# Patient Record
Sex: FEMALE | Race: WHITE | Employment: FULL TIME | URBAN - METROPOLITAN AREA
[De-identification: names, ages, dates, MRNs, and addresses within clinical notes are randomized per-mention and may not be internally consistent; named-entity substitution may affect disease eponyms.]

---

## 2020-12-10 ENCOUNTER — HOSPITAL ENCOUNTER (OUTPATIENT)
Dept: PHYSICAL THERAPY | Age: 35
Discharge: HOME | End: 2020-12-10
Payer: COMMERCIAL

## 2020-12-10 PROCEDURE — 97110 THERAPEUTIC EXERCISES: CPT

## 2020-12-10 PROCEDURE — 97161 PT EVAL LOW COMPLEX 20 MIN: CPT

## 2020-12-10 NOTE — THERAPY EVALUATION
Eyal Valle : 1985 Primary: [place] [employer]* Secondary:  [facility]rapy Center at [facility] 
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  
Patient has no allergy information on record. TREATMENT PLAN: 
Effective Dates: 12/10/2020 TO 3/10/2021 (90 days). Frequency/Duration: 1 time a week for 90 Day(s) MEDICAL/REFERRING DIAGNOSIS: 
Personal history of other complications of pregnancy, childbirth and the puerperium [Z87.59] Stress incontinence (female) (male) [N39.3] Unspecified dyspareunia [N94.10] DATE OF ONSET: chronic REFERRING PHYSICIAN: Mahogany Nguyen MD MD Orders: evaluate and treat Return MD Appointment: 2021 INITIAL ASSESSMENT:  Ms. Vieyra Seen presents with pelvic floor muscle over activity, lack of coordination, timing, awareness, endurance and strength. Patient has a chronic history of frequency and leakage that started when she was a child. After the birth of her son causing a grade 4 tear symptoms have been worse. She has painful and tight pelvic floor muscles and a very weak contraction. Has scar tissue at the perineal body. Patient also has mild bladder health habits that may be worsening symptoms. Patient would benefit from skilled physical therapy to address her deficits and maximize her function. PROBLEM LIST (Impacting functional limitations): 
Decreased [place] with Home Exercise Program 
Decreased coordination Decreased strength of pelvic floor which limits bladder control INTERVENTIONS PLANNED: 
1. Family Education 2. Home Exercise Program (HEP) 3. Manual Therapy 4. Neuromuscular Re-education/Strengthening 5. Therapeutic Activites 6. Therapeutic Exercise/Strengthening GOALS: (Goals have been discussed and agreed upon with patient.) Short-Term Functional Goals: Time Frame: 1. Pt will demonstrate I with basic PFM HEP to improve awareness, coordination, and timing of PFM. 2. Pt will demonstrate understanding of and ability to teach back appropriate water and fiber intake, toileting frequency, and positioning for improved self-management of symptoms. 3. Pt will demonstrate ability to isolate a pelvic floor contraction without breath holding and minimal to no accessory muscle use in order to implement the knack and/or urge suppression 4. Pt will demonstrate ability to perform diaphragmatic breathing and quick flick pelvic floor contractions in order to implement urge suppression 5. Pt will demonstrate ability to perform diaphragmatic breathing in multiple positions to assist in pelvic floor tension reduction. Discharge Goals: Time Frame: 1. Pt will demonstrate ability to voluntarily contract the pelvic floor muscles prior to a cough or sneeze for decreased leakage during increases in intra-abdominal pressure. 2. Pt will demonstrate independence with an advanced HEP for general conditioning, core stabilization, and mobility to facilitate carry over and independent management of symptoms. 3. Pt will be independent in implementation of a timed voiding schedule and use of urge suppression to reduce urinary frequency to 15/day and 1/night. OUTCOME MEASURE:  
Tool Used: Pelvic Floor Impact Questionnaireshort form 7 (PFIQ-7) Score:  Initial: · Bladder or Urine: 52/100 · Bowel or Rectum: 76/100 · Vagina or Pelvis: 80/100 Most Recent: X (Date: -- ) · Bladder or Urine: X 
· Bowel or Rectum: X · Vagina or Pelvis: X Interpretation of Score: Each of the 7 sections is scored on a scale from 0-3; 0 representing \"Not at all\", 3 representing \"Quite a bit\". The mean value is taken from all the answered items, then multiplied by 100 to obtain the scale score, ranging from 0-100. This process is repeated for each column representing bowel, bladder, and pelvic pain. Medical Necessity:  
· Patient demonstrates GOOD rehab potential due to higher previous functional level. Reason for Services/Other Comments: 
· Patient continues to require skilled intervention due to above mentioned deficits Tessie Henderson Total Duration: PT Patient Time In/Time Out Time In: 0800 Time Out: 0900 Rehabilitation Potential For Stated Goals: Good Regarding Penny Reed's therapy, I certify that the treatment plan above will be carried out by a therapist or under their direction. Thank you for this referral, 
Angel Augustine DPT Referring Physician Signature: Seng Farmer MD _______________________________ Date _____________ HISTORY:  
History of Present Injury/Illness (Reason for Referral): Ms. Brian Ramirez is a 38 yo female referred to pelvic floor physical therapy (PFPT) by Seng Farmer MD 2/2 stress incontinence, dyspareunia. Pt reports that symptoms began when she was a child. They looked into bladder enlargement surgery (89 years old) but wasn't eligible. She would have accidents until she was 9. She goes about 20 times a day. After her child she had a grade 4 tear (8 years). She didn't know pelvic floor existed. She has dumping syndrome from gallbladder removal. This causes incontinence with stool. Urinary: Frequency 20-25 x/day, 2-3 x/night. Positive for stress urinary incontinence, urge urinary incontinence, urinary urgency, urinary frequency and nocturia. Negative for incomplete emptying, urinary hesitancy/intermittency, dysuria, hematuria and nocturnal enuresis. Pt does not use pads for urinary protection; 0 pads per day (PPD). Fluid intake: 6 bottles  water/day; bladder irritants include: juice, coffee, hot tea. Pt does not limit fluid intake due to bladder control. Bowel: Frequency 4-5. Positive for fecal incontinence. Negative for pain with bowel movement, pushing/straining with bowel movement, constipation and incomplete emptying. Pt does not use pads for bowel protection; 0 pads per day (PPD). Sexual: Pt is sexually active with male partners. positive for dyspareunia (occasion). Pain is deep. Contraception/birth control: implant. Pelvic Organ Prolapse/Pelvic Pain: Location: none OB History: Number of pregnancies: 1 Number of vaginal deliveries: 1 Number of c-sections: 0. Past Medical History/Comorbidities: Ms. Jeny Gatica  has no past medical history on file. Ms. Jeny Gatica  has no past surgical history on file. Social History/Living Environment:  
  living with  and child Have you ever had any pelvic trauma (orthopedic in nature, fall, MVA, etc.)? NO Have you ever experienced any unwanted physical or sexual contact? NO Have you ever experienced any form of medical trauma (GYN, urological, GI, etc)? NO Prior Level of Function/Work/Activity: 
Prior level of function: indendent Occupation:  Exercise activities: - Personal Factors:   
      Sex:  female Age:  37 y.o. Ambulatory/Rehab Services H2 Model Falls Risk Assessment Risk Factors: 
     No Risk Factors Identified Ability to Rise from Chair: 
     (0)  Ability to rise in a single movement Falls Prevention Plan: No modifications necessary Total: (5 or greater = High Risk): 0  
 ©2010 [facility] [employer] States Patent #[id number]. Federal Law prohibits the replication, distribution or use without written permission from [facility] Current Medications: No current outpatient medications on file. Date Last Reviewed:  12/10/2020 Number of Personal Factors/Comorbidities that affect the Plan of Care: 0: LOW COMPLEXITY EXAMINATION:  
OBSERVATION:  
External Observations: ? Voluntary contraction: [] absent     [x] present ? Perineal descent at rest: [] absent     [x] present ? Perineal descent with bearing: [x] absent     [] present 
? Skin integrity: intact Pelvic Floor Muscle (PFM) Assessment: ? Vaginal vault size: [] decreased     [] increased     [x] WNL ? Muscle volume: [] decreased     [x] WNL     [] Defect ? PFM tension: [] decreased     [x] increased     [] WNL Contraction ability: 
Voluntary contraction: [] absent     [x] weak     [] moderate      [] strong Voluntary relaxation: [] absent     [x] partial     [] complete MMT: 1-2/5 Overflow: [] absent     [] min     [x] mod     [] severe / Compensatory mm groups include abdominals Tissue laxity test: Anterior wall: [] minimum     [] moderate     [] severe      [x] WNL Posterior wall: [] minimum     [] moderate     [] severe      [x] WNL Palpation: via vaginal canal (increased tone) Superficial Pelvic Floor Musculature (PFM): Tender? Intermediate PFM Tender? Deep PFM Tender? Superficial Transverse Perineal [x] Right  [x] Left  []N/T Deep Transverse Perineal [x] Right  [x] Left  []N/T Puborectalis [x] Right  [x] Left  []N/T Ischiocavernosus [] Right  [] Left  []N/T Compressor Urethra [] Right  [] Left  []N/T Pubococcygeus [] Right  [] Left  []N/T Bulbocavernosus [] Right  [] Left  []N/T   Ileococcygeus [] Right  [] Left  []N/T  
    Obturator Internus [x] Right  [x] Left  []N/T  
 Coccygeus [] Right  [] Left  []N/T Body Structures Involved: 1. Muscles Body Functions Affected: 1. Genitourinary 2. Neuromusculoskeletal 
3. Movement Related Activities and Participation Affected: 1. Self Care 2. Domestic Life 3. Community, Social and [place] Number of elements (examined above) that affect the Plan of Care: 1-2: LOW COMPLEXITY CLINICAL PRESENTATION:  
Presentation: Stable and uncomplicated: LOW COMPLEXITY CLINICAL DECISION MAKING:  
Use of outcome tool(s) and clinical judgement create a POC that gives a: Clear prediction of patient's progress: LOW COMPLEXITY

## 2020-12-10 NOTE — PROGRESS NOTES
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 TREATMENT PLAN:  Effective Dates: 12/10/2020 TO 3/10/2021 (90 days). Frequency/Duration: 1 time a week for 90 Day(s) MEDICAL/REFERRING DIAGNOSIS:  Personal history of other complications of pregnancy, childbirth and the puerperium [Z87.59]  Stress incontinence (female) (male) [N39.3]  Unspecified dyspareunia [N94.10]   DATE OF ONSET: chronic  REFERRING PHYSICIAN: Veneta Kussmaul, MD MD Orders: evaluate and treat  Return MD Appointment: 2021     Pre-treatment Symptoms/Complaints:  see evaluation  Pain: Initial:   0 Post Session:  010   Medications Last Reviewed:  12/10/2020   Updated Objective Findings:  See evaluation note from today   TREATMENT:   THERAPEUTIC EXERCISE: (10 minutes):  Exercises per grid below to improve mobility, strength, and coordination. Required minimal visual, verbal, and manual cues to promote proper body alignment, promote proper body posture, and promote proper body mechanics. Progressed resistance, range, and repetitions as indicated. All exercises performed with emphasis on kegel and breathing in order improve coordination, awareness and to minimize symptoms.     Date:  12-10-20 Date:   Date:     Activity/Exercise Parameters Parameters Parameters   Patient Education Discussed HEP and POC     Bladder health reviewed     drops reviewed                                Pt gives verbal consent to internal  assessment/treatment no chaperon present. [employer] Portal     Treatment/Session Summary:  Pt reports good understanding of plan of care, as well as prescribed home exercise program.  All questions were answered to pt's satisfaction. Pt was invited to call with any further questions or concerns. · Response to Treatment:  see evaluation. · Communication/Consultation:  None today  · Equipment provided today:  None today  · Recommendations/Intent for next treatment session: Next visit will focus on biofeedback, the knack, urge suppression, manual therapy.   Total Treatment Billable Duration:  10 minutes  PT Patient Time In/Time Out  Time In: 0800  Time Out: 0900  Clarke Peña DPT    Future Appointments   Date Time Provider Vipul Mckeon   12/17/2020  8:00 AM Zigmund Quick, DPT [facility]   12/29/2020  8:00 AM Zigmund Quick, DPT [facility]   1/6/2021  8:00 AM Zigmund Quick, DPT [facility]   1/13/2021  8:00 AM Zigmund Quick, DPT [facility]   1/20/2021  8:00 AM Zigmund Quick, DPT [facility]   1/27/2021  8:00 AM Zigmund Quick, DPT [facility]

## 2020-12-17 ENCOUNTER — HOSPITAL ENCOUNTER (OUTPATIENT)
Dept: PHYSICAL THERAPY | Age: 35
Discharge: HOME | End: 2020-12-17
Payer: COMMERCIAL

## 2020-12-17 PROCEDURE — 97112 NEUROMUSCULAR REEDUCATION: CPT

## 2020-12-17 PROCEDURE — 97140 MANUAL THERAPY 1/> REGIONS: CPT

## 2020-12-17 PROCEDURE — 97110 THERAPEUTIC EXERCISES: CPT

## 2020-12-17 NOTE — PROGRESS NOTES
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 TREATMENT PLAN:  Effective Dates: 12/10/2020 TO 3/10/2021 (90 days). Frequency/Duration: 1 time a week for 90 Day(s) MEDICAL/REFERRING DIAGNOSIS:  Personal history of other complications of pregnancy, childbirth and the puerperium [Z87.59]  Stress incontinence (female) (male) [N39.3]  Unspecified dyspareunia [N94.10]   DATE OF ONSET: chronic  REFERRING PHYSICIAN: Quique Shabazz MD MD Orders: evaluate and treat  Return MD Appointment: 2021     Pre-treatment Symptoms/Complaints:  Patient it was more difficult than she anticipated. She doesn't feel like she drops as much as she is able to. Pain: Initial:   0 Post Session:  0/10   Medications Last Reviewed:  2020   Updated Objective Findings:  See below  Ms. Yoselin Le is a 36 yo female referred to pelvic floor physical therapy (PFPT) by Sowmya Allen MD 2/2 stress incontinence, dyspareunia. Pt reports that symptoms began when she was a child. They looked into bladder enlargement surgery (89 years old) but wasn't eligible. She would have accidents until she was 9. She goes about 20 times a day. After her child she had a grade 4 tear (8 years). She didn't know pelvic floor existed. She has dumping syndrome from gallbladder removal. This causes incontinence with stool. Urinary: Frequency 20-25 x/day, 2-3 x/night.  Positive for stress urinary incontinence, urge urinary incontinence, urinary urgency, urinary frequency and nocturia. Negative for incomplete emptying, urinary hesitancy/intermittency, dysuria, hematuria and nocturnal enuresis. Pt does not use pads for urinary protection; 0 pads per day (PPD). Fluid intake: 6 bottles  water/day; bladder irritants include: juice, coffee, hot tea. Pt does not limit fluid intake due to bladder control. Bowel: Frequency 4-5. Positive for fecal incontinence. Negative for pain with bowel movement, pushing/straining with bowel movement, constipation and incomplete emptying. Pt does not use pads for bowel protection; 0 pads per day (PPD). Sexual: Pt is sexually active with male partners. positive for dyspareunia (occasion). Pain is deep. Contraception/birth control: implant. Pelvic Organ Prolapse/Pelvic Pain: Location: none  OB History: Number of pregnancies: 1 Number of vaginal deliveries: 1 Number of c-sections: 0. External Observations:  · Voluntary contraction: []? absent     [x]? present  · Perineal descent at rest: []? absent     [x]? present  · Perineal descent with bearing: [x]? absent     []? present  · Skin integrity: intact     Pelvic Floor Muscle (PFM) Assessment:  · Vaginal vault size: []? decreased     []? increased     [x]? WNL  · Muscle volume: []? decreased     [x]? WNL     []? Defect  · PFM tension: []? decreased     [x]? increased     []? WNL     Contraction ability:  Voluntary contraction: []? absent     [x]? weak     []? moderate      []? strong  Voluntary relaxation: []? absent     [x]? partial     []? complete   MMT: 1-2/5   Overflow: []? absent     []? min     [x]? mod     []? severe / Compensatory mm groups include abdominals     Tissue laxity test:  Anterior wall: []? minimum     []? moderate     []? severe      [x]? WNL  Posterior wall: []? minimum     []? moderate     []? severe      [x]?  WNL        Palpation: via vaginal canal (increased tone)  Superficial Pelvic Floor Musculature (PFM): Tender? Intermediate PFM Tender? Deep PFM Tender? Superficial Transverse Perineal [x]? Right  [x]? Left  []? N/T Deep Transverse Perineal [x]? Right  [x]? Left  []? N/T Puborectalis [x]? Right  [x]? Left  []? N/T   Ischiocavernosus []? Right  []? Left  []? N/T Compressor Urethra []? Right  []? Left  []? N/T Pubococcygeus []? Right  []? Left  []? N/T   Bulbocavernosus []? Right  []? Left  []? N/T     Ileococcygeus []? Right  []? Left  []? N/T           Obturator Internus [x]? Right  [x]? Left  []? N/T           Coccygeus []? Right  []? Left  []? N/T         TREATMENT:   THERAPEUTIC EXERCISE: (10 minutes):  Exercises per grid below to improve mobility, strength, and coordination. Required minimal visual, verbal, and manual cues to promote proper body alignment, promote proper body posture, and promote proper body mechanics. Progressed resistance, range, and repetitions as indicated. All exercises performed with emphasis on kegel and breathing in order improve coordination, awareness and to minimize symptoms. Date:  12-10-20 Date:  12-17-20 Date:     Activity/Exercise Parameters Parameters Parameters   Patient Education Discussed HEP and POC     Bladder health reviewed     drops reviewed     SKTC  30 sec x 2    Figure 4  30 sec x 2                   Pt gives verbal consent to internal  assessment/treatment no chaperon present. NEURO REEDUCATION: (30 Minutes) to improve control and coordination of pelvic floor     Date:  12-17-20 Date:   Date:     Activity/Exercise Parameters Parameters Parameters   Biofeedback With sEMG was utilized for coordination of PFM.  Supine, Sitting, Standing                                             MANUAL THERAPY: (20 Minutes) to improve soft tissue tone    Date Type Location Comments   12/17/2020 Internal assessment/treatment Vaginal canal SP and CTM to perineum  CTM to all layers of PFM                                         (Used abbreviations: MET - muscle energy technique; SCS- Strain counter strain; CTM-Connective tissue mobilizations; CR- Contract/relax; SP- Sustained pressure, TrP-Trigger point release, IASTM- Instrument assisted soft tissue mobilizations, TDN-Trigger point dry needling)        [facility] Portal     Treatment/Session Summary:  Pt reports good understanding of plan of care, as well as prescribed home exercise program.  All questions were answered to pt's satisfaction. Pt was invited to call with any further questions or concerns. · Response to Treatment:  Patient demonstrates difficulty maintaining relaxed position of pelvic floor. Manually perineum with scar tissue. Educated patient on scar tissue release. Updated HEP for stretches  · Communication/Consultation:  None today  · Equipment provided today:  None today  · Recommendations/Intent for next treatment session: Next visit will focus on  the knack, urge suppression, manual therapy.   Total Treatment Billable Duration:  10 minutes there ex, 15 minutes manual, 30 minutes neuro  PT Patient Time In/Time Out  Time In: 0800  Time Out: 0900  Martine Ndiaye, DPT    Future Appointments   Date Time Provider Vipul Mckeon   12/21/2020  8:00 AM Abad , DPT [facility]   12/29/2020  8:00 AM Abad , DPT [facility]   1/6/2021  8:00 AM Abad , DPT [facility]   1/13/2021  8:00 AM Aabd , DPT [facility]   1/20/2021  8:00 AM Abad , DPT [facility]   1/27/2021  8:00 AM Abad , DPT [facility]

## 2020-12-21 ENCOUNTER — HOSPITAL ENCOUNTER (OUTPATIENT)
Dept: PHYSICAL THERAPY | Age: 35
Discharge: HOME | End: 2020-12-21
Payer: COMMERCIAL

## 2020-12-21 PROCEDURE — 97140 MANUAL THERAPY 1/> REGIONS: CPT

## 2020-12-21 PROCEDURE — 97110 THERAPEUTIC EXERCISES: CPT

## 2020-12-21 NOTE — PROGRESS NOTES
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 TREATMENT PLAN:  Effective Dates: 12/10/2020 TO 3/10/2021 (90 days). Frequency/Duration: 1 time a week for 90 Day(s) MEDICAL/REFERRING DIAGNOSIS:  Personal history of other complications of pregnancy, childbirth and the puerperium [Z87.59]  Stress incontinence (female) (male) [N39.3]  Unspecified dyspareunia [N94.10]   DATE OF ONSET: chronic  REFERRING PHYSICIAN: Antonio Shabazz MD MD Orders: evaluate and treat  Return MD Appointment: 2021     Pre-treatment Symptoms/Complaints:  Patient reports she is practicing the drops and exercises. Pain: Initial:   0 Post Session:  0/10   Medications Last Reviewed:  2020   Updated Objective Findings:  See below  Ms. Katherine Murdock is a 36 yo female referred to pelvic floor physical therapy (PFPT) by Darcie Andrews MD 2/2 stress incontinence, dyspareunia. Pt reports that symptoms began when she was a child. They looked into bladder enlargement surgery (89 years old) but wasn't eligible. She would have accidents until she was 9. She goes about 20 times a day. After her child she had a grade 4 tear (8 years). She didn't know pelvic floor existed. She has dumping syndrome from gallbladder removal. This causes incontinence with stool. Urinary: Frequency 20-25 x/day, 2-3 x/night.  Positive for stress urinary incontinence, urge urinary incontinence, urinary urgency, urinary frequency and nocturia. Negative for incomplete emptying, urinary hesitancy/intermittency, dysuria, hematuria and nocturnal enuresis. Pt does not use pads for urinary protection; 0 pads per day (PPD). Fluid intake: 6 bottles  water/day; bladder irritants include: juice, coffee, hot tea. Pt does not limit fluid intake due to bladder control. Bowel: Frequency 4-5. Positive for fecal incontinence. Negative for pain with bowel movement, pushing/straining with bowel movement, constipation and incomplete emptying. Pt does not use pads for bowel protection; 0 pads per day (PPD). Sexual: Pt is sexually active with male partners. positive for dyspareunia (occasion). Pain is deep. Contraception/birth control: implant. Pelvic Organ Prolapse/Pelvic Pain: Location: none  OB History: Number of pregnancies: 1 Number of vaginal deliveries: 1 Number of c-sections: 0. External Observations:  · Voluntary contraction: []? absent     [x]? present  · Perineal descent at rest: []? absent     [x]? present  · Perineal descent with bearing: [x]? absent     []? present  · Skin integrity: intact     Pelvic Floor Muscle (PFM) Assessment:  · Vaginal vault size: []? decreased     []? increased     [x]? WNL  · Muscle volume: []? decreased     [x]? WNL     []? Defect  · PFM tension: []? decreased     [x]? increased     []? WNL     Contraction ability:  Voluntary contraction: []? absent     [x]? weak     []? moderate      []? strong  Voluntary relaxation: []? absent     [x]? partial     []? complete   MMT: 1-2/5   Overflow: []? absent     []? min     [x]? mod     []? severe / Compensatory mm groups include abdominals     Tissue laxity test:  Anterior wall: []? minimum     []? moderate     []? severe      [x]? WNL  Posterior wall: []? minimum     []? moderate     []? severe      [x]? WNL        Palpation: via vaginal canal (increased tone)  Superficial Pelvic Floor Musculature (PFM): Tender? Intermediate PFM Tender?  Deep PFM Tender? Superficial Transverse Perineal [x]? Right  [x]? Left  []? N/T Deep Transverse Perineal [x]? Right  [x]? Left  []? N/T Puborectalis [x]? Right  [x]? Left  []? N/T   Ischiocavernosus []? Right  []? Left  []? N/T Compressor Urethra []? Right  []? Left  []? N/T Pubococcygeus []? Right  []? Left  []? N/T   Bulbocavernosus []? Right  []? Left  []? N/T     Ileococcygeus []? Right  []? Left  []? N/T           Obturator Internus [x]? Right  [x]? Left  []? N/T           Coccygeus []? Right  []? Left  []? N/T         TREATMENT:   THERAPEUTIC EXERCISE: (25 minutes):  Exercises per grid below to improve mobility, strength, and coordination. Required minimal visual, verbal, and manual cues to promote proper body alignment, promote proper body posture, and promote proper body mechanics. Progressed resistance, range, and repetitions as indicated. All exercises performed with emphasis on kegel and breathing in order improve coordination, awareness and to minimize symptoms. Date:  12-10-20 Date:  12-17-20 Date:  12-21-20   Activity/Exercise Parameters Parameters Parameters   Patient Education Discussed HEP and POC     Bladder health reviewed     drops reviewed     SKTC  30 sec x 2 30 sec x 2     Figure 4  30 sec x 2 30 sec x 2     2nd position bridge   Yellow ball x 20     clamshell   Green band x 20     Quadruped leg extension   x20 B     sidestepping   2 laps green band                  Pt gives verbal consent to internal  assessment/treatment no chaperon present. NEURO REEDUCATION: (0 Minutes) to improve control and coordination of pelvic floor     Date:  12-17-20 Date:   Date:     Activity/Exercise Parameters Parameters Parameters   Biofeedback With sEMG was utilized for coordination of PFM.  Supine, Sitting, Standing                                             MANUAL THERAPY: (30 Minutes) to improve soft tissue tone    Date Type Location Comments   12/21/2020 Internal assessment/treatment Vaginal canal SP and CTM to perineum  CTM to all layers of PFM                                         (Used abbreviations: MET - muscle energy technique; SCS- Strain counter strain; CTM-Connective tissue mobilizations; CR- Contract/relax; SP- Sustained pressure, TrP-Trigger point release, IASTM- Instrument assisted soft tissue mobilizations, TDN-Trigger point dry needling)        [facility] Portal     Treatment/Session Summary:  Pt reports good understanding of plan of care, as well as prescribed home exercise program.  All questions were answered to pt's satisfaction. Pt was invited to call with any further questions or concerns. · Response to Treatment:  Patient has less tightness to pelvic floor today. Initiated there ex program.   · Communication/Consultation:  None today  · Equipment provided today:  None today  · Recommendations/Intent for next treatment session: Next visit will focus on  the knack, urge suppression, manual therapy.   Total Treatment Billable Duration:  25 minutes there ex, 30 minutes manual  PT Patient Time In/Time Out  Time In: 0800  Time Out: 0900  Rinku Figueroa DPT    Future Appointments   Date Time Provider Vipul Mckeon   12/29/2020  8:00 AM Celio Chavez DPT [facility]   1/6/2021  8:00 AM Celio Chavez DPT [facility]   1/13/2021  8:00 AM Celio Chavez DPT [facility]   1/20/2021  8:00 AM Celio Chavez DPT [facility]   1/27/2021  8:00 AM Celio Chavez DPT [facility]

## 2020-12-29 ENCOUNTER — HOSPITAL ENCOUNTER (OUTPATIENT)
Dept: PHYSICAL THERAPY | Age: 35
Discharge: HOME | End: 2020-12-29
Payer: COMMERCIAL

## 2020-12-29 PROCEDURE — 97140 MANUAL THERAPY 1/> REGIONS: CPT

## 2020-12-29 PROCEDURE — 97110 THERAPEUTIC EXERCISES: CPT

## 2020-12-29 NOTE — PROGRESS NOTES
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 TREATMENT PLAN:  Effective Dates: 12/10/2020 TO 3/10/2021 (90 days). Frequency/Duration: 1 time a week for 90 Day(s) MEDICAL/REFERRING DIAGNOSIS:  Personal history of other complications of pregnancy, childbirth and the puerperium [Z87.59]  Stress incontinence (female) (male) [N39.3]  Unspecified dyspareunia [N94.10]   DATE OF ONSET: chronic  REFERRING PHYSICIAN: Radha Shabazz MD MD Orders: evaluate and treat  Return MD Appointment: 2021     Pre-treatment Symptoms/Complaints:  Patient reports she has tried not to go just because. She feels like she is going less. She is practicing her drops in the shower. Pain: Initial:   0 Post Session:  0/10   Medications Last Reviewed:  2020   Updated Objective Findings:  See below  Ms. Brittanie Klein is a 36 yo female referred to pelvic floor physical therapy (PFPT) by Lauro Curiel MD 2/2 stress incontinence, dyspareunia. Pt reports that symptoms began when she was a child. They looked into bladder enlargement surgery (89 years old) but wasn't eligible. She would have accidents until she was 9. She goes about 20 times a day. After her child she had a grade 4 tear (8 years). She didn't know pelvic floor existed. She has dumping syndrome from gallbladder removal. This causes incontinence with stool. Urinary: Frequency 20-25 x/day, 2-3 x/night.  Positive for stress urinary incontinence, urge urinary incontinence, urinary urgency, urinary frequency and nocturia. Negative for incomplete emptying, urinary hesitancy/intermittency, dysuria, hematuria and nocturnal enuresis. Pt does not use pads for urinary protection; 0 pads per day (PPD). Fluid intake: 6 bottles  water/day; bladder irritants include: juice, coffee, hot tea. Pt does not limit fluid intake due to bladder control. Bowel: Frequency 4-5. Positive for fecal incontinence. Negative for pain with bowel movement, pushing/straining with bowel movement, constipation and incomplete emptying. Pt does not use pads for bowel protection; 0 pads per day (PPD). Sexual: Pt is sexually active with male partners. positive for dyspareunia (occasion). Pain is deep. Contraception/birth control: implant. Pelvic Organ Prolapse/Pelvic Pain: Location: none  OB History: Number of pregnancies: 1 Number of vaginal deliveries: 1 Number of c-sections: 0. External Observations:  · Voluntary contraction: []? absent     [x]? present  · Perineal descent at rest: []? absent     [x]? present  · Perineal descent with bearing: [x]? absent     []? present  · Skin integrity: intact     Pelvic Floor Muscle (PFM) Assessment:  · Vaginal vault size: []? decreased     []? increased     [x]? WNL  · Muscle volume: []? decreased     [x]? WNL     []? Defect  · PFM tension: []? decreased     [x]? increased     []? WNL     Contraction ability:  Voluntary contraction: []? absent     [x]? weak     []? moderate      []? strong  Voluntary relaxation: []? absent     [x]? partial     []? complete   MMT: 1-2/5   Overflow: []? absent     []? min     [x]? mod     []? severe / Compensatory mm groups include abdominals     Tissue laxity test:  Anterior wall: []? minimum     []? moderate     []? severe      [x]? WNL  Posterior wall: []? minimum     []? moderate     []? severe      [x]?  WNL        Palpation: via vaginal canal (increased tone)  Superficial Pelvic Floor Musculature (PFM): Tender? Intermediate PFM Tender? Deep PFM Tender? Superficial Transverse Perineal [x]? Right  [x]? Left  []? N/T Deep Transverse Perineal [x]? Right  [x]? Left  []? N/T Puborectalis [x]? Right  [x]? Left  []? N/T   Ischiocavernosus []? Right  []? Left  []? N/T Compressor Urethra []? Right  []? Left  []? N/T Pubococcygeus []? Right  []? Left  []? N/T   Bulbocavernosus []? Right  []? Left  []? N/T     Ileococcygeus []? Right  []? Left  []? N/T           Obturator Internus [x]? Right  [x]? Left  []? N/T           Coccygeus []? Right  []? Left  []? N/T         TREATMENT:   THERAPEUTIC EXERCISE: (35 minutes):  Exercises per grid below to improve mobility, strength, and coordination. Required minimal visual, verbal, and manual cues to promote proper body alignment, promote proper body posture, and promote proper body mechanics. Progressed resistance, range, and repetitions as indicated. All exercises performed with emphasis on kegel and breathing in order improve coordination, awareness and to minimize symptoms. Date:  12-10-20 Date:  12-17-20 Date:  12-21-20 Date:  12-29-20   Activity/Exercise Parameters Parameters Parameters    Patient Education Discussed HEP and POC      Bladder health reviewed        drops reviewed        SKTC  30 sec x 2 30 sec x 2      Figure 4  30 sec x 2 30 sec x 2      2nd position bridge   Yellow ball x 20   Yellow ball x 25   clamshell   Green band x 20   Green band x 25   Quadruped leg extension   x20 B   x20 B   sidestepping   2 laps green band 2 laps green band   Rows      Blue band x 25   Extensions      Blue band x 25   Chops      Blue band x 25   3rd position bridge    Green band x 25                      Pt gives verbal consent to internal  assessment/treatment no chaperon present.      NEURO REEDUCATION: (0 Minutes) to improve control and coordination of pelvic floor     Date:  12-17-20 Date:   Date:     Activity/Exercise Parameters Parameters Parameters Biofeedback With sEMG was utilized for coordination of PFM. Supine, Sitting, Standing                                             MANUAL THERAPY: (20 Minutes) to improve soft tissue tone    Date Type Location Comments   12/29/2020 Internal assessment/treatment Vaginal canal SP and CTM to perineum  CTM to all layers of PFM                                         (Used abbreviations: MET - muscle energy technique; SCS- Strain counter strain; CTM-Connective tissue mobilizations; CR- Contract/relax; SP- Sustained pressure, TrP-Trigger point release, IASTM- Instrument assisted soft tissue mobilizations, TDN-Trigger point dry needling)        [facility] Portal     Treatment/Session Summary:  Pt reports good understanding of plan of care, as well as prescribed home exercise program.  All questions were answered to pt's satisfaction. Pt was invited to call with any further questions or concerns. · Response to Treatment:  Patient continues to improve with pelvic floor tone. Much better contract and relax. · Communication/Consultation:  None today  · Equipment provided today:  None today  · Recommendations/Intent for next treatment session: Next visit will focus on  the knack, urge suppression, manual therapy.   Total Treatment Billable Duration:  35 minutes there ex, 20 minutes manual  PT Patient Time In/Time Out  Time In: 0800  Time Out: 0900  Yazmin Rodriguez DPT    Future Appointments   Date Time Provider Vipul Mckeon   1/6/2021  8:00 AM Logan Bryan DPT [facility]   1/13/2021  8:00 AM Logan Bryan DPT [facility]   1/20/2021  8:00 AM Logan Bryan DPT [facility]   1/27/2021  8:00 AM Logan Bryan DPT [facility]

## 2021-01-06 ENCOUNTER — HOSPITAL ENCOUNTER (OUTPATIENT)
Dept: PHYSICAL THERAPY | Age: 36
Discharge: HOME | End: 2021-01-06
Payer: COMMERCIAL

## 2021-01-06 PROCEDURE — 97140 MANUAL THERAPY 1/> REGIONS: CPT

## 2021-01-06 PROCEDURE — 97110 THERAPEUTIC EXERCISES: CPT

## 2021-01-06 NOTE — PROGRESS NOTES
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 TREATMENT PLAN:  Effective Dates: 12/10/2020 TO 3/10/2021 (90 days). Frequency/Duration: 1 time a week for 90 Day(s) MEDICAL/REFERRING DIAGNOSIS:  Personal history of other complications of pregnancy, childbirth and the puerperium [Z87.59]  Stress incontinence (female) (male) [N39.3]  Unspecified dyspareunia [N94.10]   DATE OF ONSET: chronic  REFERRING PHYSICIAN: Mine Shabazz MD MD Orders: evaluate and treat  Return MD Appointment: 2021     Pre-treatment Symptoms/Complaints:  Patient reports she isn't going to the bathroom as much. She went to the [employer] park and she did not have leakage. Pain: Initial:   0 Post Session:  0/10   Medications Last Reviewed:  2021   Updated Objective Findings:  See below  Ms. Cherelle Gann is a 36 yo female referred to pelvic floor physical therapy (PFPT) by Florentino Brown MD 2/2 stress incontinence, dyspareunia. Pt reports that symptoms began when she was a child. They looked into bladder enlargement surgery (89 years old) but wasn't eligible. She would have accidents until she was 9. She goes about 20 times a day. After her child she had a grade 4 tear (8 years). She didn't know pelvic floor existed. She has dumping syndrome from gallbladder removal. This causes incontinence with stool. Urinary: Frequency 10-15 x/day, 1-2 x/night.  Positive for stress urinary incontinence, urge urinary incontinence, urinary urgency, urinary frequency and nocturia. Negative for incomplete emptying, urinary hesitancy/intermittency, dysuria, hematuria and nocturnal enuresis. Pt does not use pads for urinary protection; 0 pads per day (PPD). Fluid intake: 6 bottles  water/day; bladder irritants include: juice, coffee, hot tea. Pt does not limit fluid intake due to bladder control. Bowel: Frequency 4-5. Positive for fecal incontinence. Negative for pain with bowel movement, pushing/straining with bowel movement, constipation and incomplete emptying. Pt does not use pads for bowel protection; 0 pads per day (PPD). Sexual: Pt is sexually active with male partners. positive for dyspareunia (occasion). Pain is deep. Contraception/birth control: implant. Pelvic Organ Prolapse/Pelvic Pain: Location: none  OB History: Number of pregnancies: 1 Number of vaginal deliveries: 1 Number of c-sections: 0. External Observations:  · Voluntary contraction: []? absent     [x]? present  · Perineal descent at rest: []? absent     [x]? present  · Perineal descent with bearing: [x]? absent     []? present  · Skin integrity: intact     Pelvic Floor Muscle (PFM) Assessment:  · Vaginal vault size: []? decreased     []? increased     [x]? WNL  · Muscle volume: []? decreased     [x]? WNL     []? Defect  · PFM tension: []? decreased     [x]? increased     []? WNL     Contraction ability:  Voluntary contraction: []? absent     [x]? weak     []? moderate      []? strong  Voluntary relaxation: []? absent     [x]? partial     []? complete   MMT: 1-2/5   Overflow: []? absent     []? min     [x]? mod     []? severe / Compensatory mm groups include abdominals     Tissue laxity test:  Anterior wall: []? minimum     []? moderate     []? severe      [x]? WNL  Posterior wall: []? minimum     []? moderate     []? severe      [x]?  WNL        Palpation: via vaginal canal (increased tone)  Superficial Pelvic Floor Musculature (PFM): Tender? Intermediate PFM Tender? Deep PFM Tender? Superficial Transverse Perineal [x]? Right  [x]? Left  []? N/T Deep Transverse Perineal [x]? Right  [x]? Left  []? N/T Puborectalis [x]? Right  [x]? Left  []? N/T   Ischiocavernosus []? Right  []? Left  []? N/T Compressor Urethra []? Right  []? Left  []? N/T Pubococcygeus []? Right  []? Left  []? N/T   Bulbocavernosus []? Right  []? Left  []? N/T     Ileococcygeus []? Right  []? Left  []? N/T           Obturator Internus [x]? Right  [x]? Left  []? N/T           Coccygeus []? Right  []? Left  []? N/T         TREATMENT:   THERAPEUTIC EXERCISE: (45 minutes):  Exercises per grid below to improve mobility, strength, and coordination. Required minimal visual, verbal, and manual cues to promote proper body alignment, promote proper body posture, and promote proper body mechanics. Progressed resistance, range, and repetitions as indicated. All exercises performed with emphasis on kegel and breathing in order improve coordination, awareness and to minimize symptoms. Date:  12-10-20 Date:  12-17-20 Date:  12-21-20 Date:  12-29-20 Date:  1-6-21   Activity/Exercise Parameters Parameters Parameters     Patient Education Discussed HEP and POC       Bladder health reviewed         drops reviewed         SKTC  30 sec x 2 30 sec x 2       Figure 4  30 sec x 2 30 sec x 2       2nd position bridge   Yellow ball x 20   Yellow ball x 25 Yellow ball x 25   clamshell   Green band x 20   Green band x 25 Green band x 25   Quadruped leg extension   x20 B   x20 B    sidestepping   2 laps green band 2 laps green band 2 laps green band   Rows      Blue band x 25 Blue band x 25   Extensions      Blue band x 25 Blue band x 25   Chops      Blue band x 25 Blue band x 25   3rd position bridge    Green band x 25   Green band x 25                      Pt gives verbal consent to internal  assessment/treatment no chaperon present.      NEURO REEDUCATION: (0 Minutes) to improve control and coordination of pelvic floor     Date:  12-17-20 Date:   Date:     Activity/Exercise Parameters Parameters Parameters   Biofeedback With sEMG was utilized for coordination of PFM. Supine, Sitting, Standing                                             MANUAL THERAPY: (10 Minutes) to improve soft tissue tone    Date Type Location Comments   1/6/2021 Internal assessment/treatment Vaginal canal SP and CTM to perineum  CTM to all layers of PFM                                         (Used abbreviations: MET - muscle energy technique; SCS- Strain counter strain; CTM-Connective tissue mobilizations; CR- Contract/relax; SP- Sustained pressure, TrP-Trigger point release, IASTM- Instrument assisted soft tissue mobilizations, TDN-Trigger point dry needling)        [employer] Portal     Treatment/Session Summary:  Pt reports good understanding of plan of care, as well as prescribed home exercise program.  All questions were answered to pt's satisfaction. Pt was invited to call with any further questions or concerns. · Response to Treatment:  Patient does not fatigue as quickly with there ex. Continues to maintain good tone in pelvic floor  · Communication/Consultation:  None today  · Equipment provided today:  None today  · Recommendations/Intent for next treatment session: Next visit will focus on  the knack, urge suppression, manual therapy.   Total Treatment Billable Duration:  45 minutes there ex, 10 minutes manual  PT Patient Time In/Time Out  Time In: 0800  Time Out: 0900  Daria Thompson DPT    Future Appointments   Date Time Provider Vipul Mckeon   1/13/2021  8:00 AM Cathleen Cornelius DPT [facility]   1/20/2021  8:00 AM Cathleen Cornelius DPT [facility]   1/27/2021  8:00 AM Cathleen Cornelius DPT [facility]

## 2021-01-13 ENCOUNTER — HOSPITAL ENCOUNTER (OUTPATIENT)
Dept: PHYSICAL THERAPY | Age: 36
Discharge: HOME | End: 2021-01-13
Payer: COMMERCIAL

## 2021-01-13 PROCEDURE — 97110 THERAPEUTIC EXERCISES: CPT

## 2021-01-13 NOTE — PROGRESS NOTES
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 TREATMENT PLAN:  Effective Dates: 12/10/2020 TO 3/10/2021 (90 days). Frequency/Duration: 1 time a week for 90 Day(s) MEDICAL/REFERRING DIAGNOSIS:  Personal history of other complications of pregnancy, childbirth and the puerperium [Z87.59]  Stress incontinence (female) (male) [N39.3]  Unspecified dyspareunia [N94.10]   DATE OF ONSET: chronic  REFERRING PHYSICIAN: Arabella Shabazz MD MD Orders: evaluate and treat  Return MD Appointment: 2021     Pre-treatment Symptoms/Complaints:  Patient reports things are still better. Hasn't had any accidents. Has been able to sit Tapan style for longer lengths of time. Pain: Initial:   0 Post Session:  0/10   Medications Last Reviewed:  2021   Updated Objective Findings:  See below  Ms. Bayron Marin is a 36 yo female referred to pelvic floor physical therapy (PFPT) by Radha Espinosa MD 2/2 stress incontinence, dyspareunia. Pt reports that symptoms began when she was a child. They looked into bladder enlargement surgery (89 years old) but wasn't eligible. She would have accidents until she was 9. She goes about 20 times a day. After her child she had a grade 4 tear (8 years). She didn't know pelvic floor existed. She has dumping syndrome from gallbladder removal. This causes incontinence with stool. Urinary: Frequency 10-15 x/day, 1 (not as often) x/night. Positive for urinary frequency and nocturia. Negative for stress urinary incontinence, urge urinary incontinence, urinary urgency, incomplete emptying, urinary hesitancy/intermittency, dysuria, hematuria and nocturnal enuresis. Pt does not use pads for urinary protection; 0 pads per day (PPD). Fluid intake: 6 bottles  water/day; bladder irritants include: juice, coffee, hot tea. Pt does not limit fluid intake due to bladder control. Bowel: Frequency 4-5. Positive for fecal incontinence. Negative for pain with bowel movement, pushing/straining with bowel movement, constipation and incomplete emptying. Pt does not use pads for bowel protection; 0 pads per day (PPD). Sexual: Pt is sexually active with male partners. positive for dyspareunia (occasion). Pain is deep. Contraception/birth control: implant. Pelvic Organ Prolapse/Pelvic Pain: Location: none  OB History: Number of pregnancies: 1 Number of vaginal deliveries: 1 Number of c-sections: 0. External Observations:  · Voluntary contraction: []? absent     [x]? present  · Perineal descent at rest: []? absent     [x]? present  · Perineal descent with bearing: [x]? absent     []? present  · Skin integrity: intact      Pelvic Floor Muscle (PFM) Assessment:  · Vaginal vault size: []? decreased     []? increased     [x]? WNL  · Muscle volume: []? decreased     [x]? WNL     []? Defect  · PFM tension: []? decreased     [x]? increased     []? WNL     Contraction ability:  Voluntary contraction: []? absent     [x]? weak     [x]? moderate      []? strong  Voluntary relaxation: []? absent     []? partial     [x]? complete   MMT: 3/5   Overflow: []? absent     [x]? min     []? mod     []? severe / Compensatory mm groups     Tissue laxity test:  Anterior wall: []? minimum     []? moderate     []? severe      [x]? WNL  Posterior wall: []? minimum     []? moderate     []? severe      [x]?  WNL        Palpation: via vaginal canal   Superficial Pelvic Floor Musculature (PFM): Tender? Intermediate PFM Tender? Deep PFM Tender? Superficial Transverse Perineal []? Right  []? Left  []? N/T Deep Transverse Perineal []? Right  []? Left  []? N/T Puborectalis []? Right  []? Left  []? N/T   Ischiocavernosus []? Right  []? Left  []? N/T Compressor Urethra []? Right  []? Left  []? N/T Pubococcygeus []? Right  []? Left  []? N/T   Bulbocavernosus []? Right  []? Left  []? N/T     Ileococcygeus []? Right  []? Left  []? N/T           Obturator Internus []? Right  []? Left  []? N/T           Coccygeus []? Right  []? Left  []? N/T         TREATMENT:   THERAPEUTIC EXERCISE: (45 minutes):  Exercises per grid below to improve mobility, strength, and coordination. Required minimal visual, verbal, and manual cues to promote proper body alignment, promote proper body posture, and promote proper body mechanics. Progressed resistance, range, and repetitions as indicated. All exercises performed with emphasis on kegel and breathing in order improve coordination, awareness and to minimize symptoms.     Date:  12-10-20 Date:  12-17-20 Date:  12-21-20 Date:  12-29-20 Date:  1-6-21 Date:  1-13-21   Activity/Exercise Parameters Parameters Parameters      Patient Education Discussed HEP and POC        Bladder health reviewed          drops reviewed          SKTC  30 sec x 2 30 sec x 2        Figure 4  30 sec x 2 30 sec x 2        2nd position bridge   Yellow ball x 20   Yellow ball x 25 Yellow ball x 25 Yellow ball x 25   clamshell   Green band x 20   Green band x 25 Green band x 25 Green band x 25   Quadruped leg extension   x20 B   x20 B     sidestepping   2 laps green band 2 laps green band 2 laps green band 2 laps green band   Rows      Blue band x 25 Blue band x 25 Blue band x 25   Extensions      Blue band x 25 Blue band x 25 Blue band x 25   Chops      Blue band x 25 Blue band x 25 Blue band x 25   3rd position bridge    Green band x 25   Green band x 25 Green band x 25                        Pt gives verbal consent to internal  assessment/treatment no chaperon present. NEURO REEDUCATION: (0 Minutes) to improve control and coordination of pelvic floor     Date:  12-17-20 Date:   Date:     Activity/Exercise Parameters Parameters Parameters   Biofeedback With sEMG was utilized for coordination of PFM. Supine, Sitting, Standing                                             MANUAL THERAPY: (0 Minutes) to improve soft tissue tone    Date Type Location Comments   1/13/2021 Internal assessment/treatment Vaginal canal SP and CTM to perineum  CTM to all layers of PFM                                         (Used abbreviations: MET - muscle energy technique; SCS- Strain counter strain; CTM-Connective tissue mobilizations; CR- Contract/relax; SP- Sustained pressure, TrP-Trigger point release, IASTM- Instrument assisted soft tissue mobilizations, TDN-Trigger point dry needling)        [facility]     Treatment/Session Summary:  Pt reports good understanding of plan of care, as well as prescribed home exercise program.  All questions were answered to pt's satisfaction. Pt was invited to call with any further questions or concerns.   Total Treatment Billable Duration:  45 minutes there ex  PT Patient Time In/Time Out  Time In: 0800  Time Out: Keaton Goddard DPT    Future Appointments   Date Time Provider Vipul Mckeon   1/20/2021  8:00 AM Harinder Quinones DPT [facility]   1/27/2021  8:00 AM Harinder Quinones DPT [facility]

## 2021-01-13 NOTE — THERAPY DISCHARGE
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 MEDICAL/REFERRING DIAGNOSIS:  Personal history of other complications of pregnancy, childbirth and the puerperium [Z87.59]  Stress incontinence (female) (male) [N39.3]  Unspecified dyspareunia [N94.10]   DATE OF ONSET: chronic  REFERRING PHYSICIAN: Adrienne Swain MD MD Orders: evaluate and treat  Return MD Appointment: 2021   DISCHARGE SUMMARY: Ms. Shalom Nguyen has been seen in skilled PT from 12-10-20 to 21. Patient has attended 6 out of 6 scheduled visits, with 0 cancellation(s) and 0 no shows. Treatment has emphasized manual therapy, coordination of PFM, urge suppression. Patient responded well to therapy, with improvements in no longer has urinary leakage and has decreased her urinary frequency. Pt has achieved goals as indicated below and all questions have been answered to their satisfaction. Pt was invited to call with any further questions or concerns as needed. INITIAL ASSESSMENT:  Ms. Shalom Nguyen presents with pelvic floor muscle over activity, lack of coordination, timing, awareness, endurance and strength. Patient has a chronic history of frequency and leakage that started when she was a child. After the birth of her son causing a grade 4 tear symptoms have been worse. She has painful and tight pelvic floor muscles and a very weak contraction. Has scar tissue at the perineal body.  Patient also has mild bladder health habits that may be worsening symptoms. Patient would benefit from skilled physical therapy to address her deficits and maximize her function. GOALS: (Goals have been discussed and agreed upon with patient.)  Short-Term Functional Goals: Time Frame:   1. Pt will demonstrate I with basic PFM HEP to improve awareness, coordination, and timing of PFM. MET  2. Pt will demonstrate understanding of and ability to teach back appropriate water and fiber intake, toileting frequency, and positioning for improved self-management of symptoms. MET  3. Pt will demonstrate ability to isolate a pelvic floor contraction without breath holding and minimal to no accessory muscle use in order to implement the knack and/or urge suppression MET  4. Pt will demonstrate ability to perform diaphragmatic breathing and quick flick pelvic floor contractions in order to implement urge suppressionMET  5. Pt will demonstrate ability to perform diaphragmatic breathing in multiple positions to assist in pelvic floor tension reduction. MET  Discharge Goals: Time Frame:   1. Pt will demonstrate ability to voluntarily contract the pelvic floor muscles prior to a cough or sneeze for decreased leakage during increases in intra-abdominal pressure. MET  2. Pt will demonstrate independence with an advanced HEP for general conditioning, core stabilization, and mobility to facilitate carry over and independent management of symptoms. MET  3. Pt will be independent in implementation of a timed voiding schedule and use of urge suppression to reduce urinary frequency to 15/day and 1/night.  MET    OUTCOME MEASURE:   Tool Used: Pelvic Floor Impact Questionnaire--short form 7 (PFIQ-7)   Score:  Initial:   · Bladder or Urine: 52/100  · Bowel or Rectum: 76/100  · Vagina or Pelvis: 80/100 Most Recent: X (Date: -- )  · Bladder or Urine: 47  · Bowel or Rectum: X  · Vagina or Pelvis: 28   Interpretation of Score: Each of the 7 sections is scored on a scale from 0-3; 0 representing \"Not at all\", 3 representing \"Quite a bit\". The mean value is taken from all the answered items, then multiplied by 100 to obtain the scale score, ranging from 0-100. This process is repeated for each column representing bowel, bladder, and pelvic pain. .  Total Duration:   PT Patient Time In/Time Out  Time In: 0800  Time Out: 0845      Regarding Mary Jo Reed's therapy, I certify that the treatment plan above will be carried out by a therapist or under their direction. Thank you for this referral,  Milly Rai DPT     Referring Physician Signature:  Moshe Benitez MD _______________________________ Date _____________

## 2021-01-20 ENCOUNTER — APPOINTMENT (OUTPATIENT)
Dept: PHYSICAL THERAPY | Age: 36
End: 2021-01-20
Payer: COMMERCIAL

## 2021-01-27 ENCOUNTER — APPOINTMENT (OUTPATIENT)
Dept: PHYSICAL THERAPY | Age: 36
End: 2021-01-27
Payer: COMMERCIAL